# Patient Record
Sex: MALE | Race: WHITE | Employment: OTHER | URBAN - METROPOLITAN AREA
[De-identification: names, ages, dates, MRNs, and addresses within clinical notes are randomized per-mention and may not be internally consistent; named-entity substitution may affect disease eponyms.]

---

## 2018-09-28 RX ORDER — MYCOPHENOLATE MOFETIL 250 MG/1
750 CAPSULE ORAL
COMMUNITY

## 2018-09-28 RX ORDER — SODIUM BICARBONATE 650 MG/1
650 TABLET ORAL 3 TIMES DAILY
COMMUNITY

## 2018-09-28 RX ORDER — TACROLIMUS 1 MG/1
1 CAPSULE ORAL 2 TIMES DAILY
COMMUNITY

## 2018-09-28 RX ORDER — DILTIAZEM HYDROCHLORIDE EXTENDED-RELEASE TABLETS 300 MG/1
300 TABLET, EXTENDED RELEASE ORAL DAILY
COMMUNITY

## 2018-09-28 RX ORDER — ATORVASTATIN CALCIUM 10 MG/1
10 TABLET, FILM COATED ORAL DAILY
COMMUNITY

## 2018-09-29 RX ORDER — KETOROLAC TROMETHAMINE 5 MG/ML
1 SOLUTION OPHTHALMIC SEE ADMIN INSTRUCTIONS
Status: CANCELLED | OUTPATIENT
Start: 2018-09-29

## 2018-09-29 RX ORDER — TROPICAMIDE 10 MG/ML
2 SOLUTION/ DROPS OPHTHALMIC SEE ADMIN INSTRUCTIONS
Status: CANCELLED | OUTPATIENT
Start: 2018-09-29

## 2018-09-29 RX ORDER — HOMATROPINE HYDROBROMIDE OPHTHALMIC 50 MG/ML
2 SOLUTION OPHTHALMIC SEE ADMIN INSTRUCTIONS
Status: CANCELLED | OUTPATIENT
Start: 2018-09-29

## 2018-09-29 RX ORDER — PHENYLEPHRINE HCL 2.5 %
2 DROPS OPHTHALMIC (EYE) SEE ADMIN INSTRUCTIONS
Status: CANCELLED | OUTPATIENT
Start: 2018-09-29

## 2018-10-01 ENCOUNTER — ANESTHESIA (OUTPATIENT)
Dept: SURGERY | Facility: HOSPITAL | Age: 63
End: 2018-10-01
Payer: COMMERCIAL

## 2018-10-01 ENCOUNTER — HOSPITAL ENCOUNTER (OUTPATIENT)
Facility: HOSPITAL | Age: 63
Setting detail: HOSPITAL OUTPATIENT SURGERY
Discharge: HOME | End: 2018-10-01
Attending: OPHTHALMOLOGY | Admitting: OPHTHALMOLOGY
Payer: COMMERCIAL

## 2018-10-01 ENCOUNTER — ANESTHESIA EVENT (OUTPATIENT)
Dept: SURGERY | Facility: HOSPITAL | Age: 63
End: 2018-10-01
Payer: COMMERCIAL

## 2018-10-01 VITALS
BODY MASS INDEX: 18.28 KG/M2 | OXYGEN SATURATION: 98 % | HEIGHT: 75 IN | TEMPERATURE: 98 F | DIASTOLIC BLOOD PRESSURE: 65 MMHG | HEART RATE: 48 BPM | WEIGHT: 147 LBS | SYSTOLIC BLOOD PRESSURE: 107 MMHG | RESPIRATION RATE: 16 BRPM

## 2018-10-01 DIAGNOSIS — H33.001 RETINAL DETACHMENT OF RIGHT EYE WITH RETINAL BREAK: Primary | ICD-10-CM

## 2018-10-01 PROCEDURE — 08QE3ZZ REPAIR RIGHT RETINA, PERCUTANEOUS APPROACH: ICD-10-PCS | Performed by: OPHTHALMOLOGY

## 2018-10-01 PROCEDURE — 08B43ZZ EXCISION OF RIGHT VITREOUS, PERCUTANEOUS APPROACH: ICD-10-PCS | Performed by: OPHTHALMOLOGY

## 2018-10-01 PROCEDURE — 3E0C3GC INTRODUCTION OF OTHER THERAPEUTIC SUBSTANCE INTO EYE, PERCUTANEOUS APPROACH: ICD-10-PCS | Performed by: OPHTHALMOLOGY

## 2018-10-01 RX ORDER — HALOPERIDOL 5 MG/ML
0.25 INJECTION INTRAMUSCULAR ONCE AS NEEDED
Status: DISCONTINUED | OUTPATIENT
Start: 2018-10-01 | End: 2018-10-01

## 2018-10-01 RX ORDER — HYDROCODONE BITARTRATE AND ACETAMINOPHEN 5; 325 MG/1; MG/1
2 TABLET ORAL AS NEEDED
Status: DISCONTINUED | OUTPATIENT
Start: 2018-10-01 | End: 2018-10-01

## 2018-10-01 RX ORDER — SODIUM CHLORIDE, SODIUM LACTATE, POTASSIUM CHLORIDE, CALCIUM CHLORIDE 600; 310; 30; 20 MG/100ML; MG/100ML; MG/100ML; MG/100ML
INJECTION, SOLUTION INTRAVENOUS CONTINUOUS
Status: DISCONTINUED | OUTPATIENT
Start: 2018-10-01 | End: 2018-10-01

## 2018-10-01 RX ORDER — FAMOTIDINE 20 MG/1
20 TABLET ORAL ONCE
Status: DISCONTINUED | OUTPATIENT
Start: 2018-10-01 | End: 2018-10-01 | Stop reason: HOSPADM

## 2018-10-01 RX ORDER — TROPICAMIDE 10 MG/ML
2 SOLUTION/ DROPS OPHTHALMIC SEE ADMIN INSTRUCTIONS
Status: COMPLETED | OUTPATIENT
Start: 2018-10-01 | End: 2018-10-01

## 2018-10-01 RX ORDER — BALANCED SALT SOLUTION 6.4; .75; .48; .3; 3.9; 1.7 MG/ML; MG/ML; MG/ML; MG/ML; MG/ML; MG/ML
SOLUTION OPHTHALMIC AS NEEDED
Status: DISCONTINUED | OUTPATIENT
Start: 2018-10-01 | End: 2018-10-01 | Stop reason: HOSPADM

## 2018-10-01 RX ORDER — METOCLOPRAMIDE 10 MG/1
10 TABLET ORAL ONCE
Status: DISCONTINUED | OUTPATIENT
Start: 2018-10-01 | End: 2018-10-01 | Stop reason: HOSPADM

## 2018-10-01 RX ORDER — ACETAMINOPHEN 500 MG
1000 TABLET ORAL ONCE
Status: COMPLETED | OUTPATIENT
Start: 2018-10-01 | End: 2018-10-01

## 2018-10-01 RX ORDER — DEXAMETHASONE SODIUM PHOSPHATE 4 MG/ML
VIAL (ML) INJECTION AS NEEDED
Status: DISCONTINUED | OUTPATIENT
Start: 2018-10-01 | End: 2018-10-01 | Stop reason: HOSPADM

## 2018-10-01 RX ORDER — NALOXONE HYDROCHLORIDE 0.4 MG/ML
80 INJECTION, SOLUTION INTRAMUSCULAR; INTRAVENOUS; SUBCUTANEOUS AS NEEDED
Status: DISCONTINUED | OUTPATIENT
Start: 2018-10-01 | End: 2018-10-01

## 2018-10-01 RX ORDER — MORPHINE SULFATE 10 MG/ML
6 INJECTION, SOLUTION INTRAMUSCULAR; INTRAVENOUS EVERY 10 MIN PRN
Status: DISCONTINUED | OUTPATIENT
Start: 2018-10-01 | End: 2018-10-01

## 2018-10-01 RX ORDER — MIDAZOLAM HYDROCHLORIDE 1 MG/ML
INJECTION INTRAMUSCULAR; INTRAVENOUS AS NEEDED
Status: DISCONTINUED | OUTPATIENT
Start: 2018-10-01 | End: 2018-10-01 | Stop reason: SURG

## 2018-10-01 RX ORDER — ONDANSETRON 2 MG/ML
4 INJECTION INTRAMUSCULAR; INTRAVENOUS ONCE AS NEEDED
Status: DISCONTINUED | OUTPATIENT
Start: 2018-10-01 | End: 2018-10-01

## 2018-10-01 RX ORDER — OFLOXACIN 3 MG/ML
SOLUTION/ DROPS OPHTHALMIC AS NEEDED
Status: DISCONTINUED | OUTPATIENT
Start: 2018-10-01 | End: 2018-10-01 | Stop reason: HOSPADM

## 2018-10-01 RX ORDER — KETAMINE HYDROCHLORIDE 50 MG/ML
INJECTION, SOLUTION, CONCENTRATE INTRAMUSCULAR; INTRAVENOUS AS NEEDED
Status: DISCONTINUED | OUTPATIENT
Start: 2018-10-01 | End: 2018-10-01 | Stop reason: SURG

## 2018-10-01 RX ORDER — KETOROLAC TROMETHAMINE 5 MG/ML
1 SOLUTION OPHTHALMIC SEE ADMIN INSTRUCTIONS
Status: COMPLETED | OUTPATIENT
Start: 2018-10-01 | End: 2018-10-01

## 2018-10-01 RX ORDER — HYDROCODONE BITARTRATE AND ACETAMINOPHEN 5; 325 MG/1; MG/1
1 TABLET ORAL AS NEEDED
Status: DISCONTINUED | OUTPATIENT
Start: 2018-10-01 | End: 2018-10-01

## 2018-10-01 RX ORDER — PHENYLEPHRINE HCL 2.5 %
2 DROPS OPHTHALMIC (EYE) SEE ADMIN INSTRUCTIONS
Status: COMPLETED | OUTPATIENT
Start: 2018-10-01 | End: 2018-10-01

## 2018-10-01 RX ORDER — MORPHINE SULFATE 4 MG/ML
4 INJECTION, SOLUTION INTRAMUSCULAR; INTRAVENOUS EVERY 10 MIN PRN
Status: DISCONTINUED | OUTPATIENT
Start: 2018-10-01 | End: 2018-10-01

## 2018-10-01 RX ORDER — MORPHINE SULFATE 4 MG/ML
2 INJECTION, SOLUTION INTRAMUSCULAR; INTRAVENOUS EVERY 10 MIN PRN
Status: DISCONTINUED | OUTPATIENT
Start: 2018-10-01 | End: 2018-10-01

## 2018-10-01 RX ORDER — EPHEDRINE SULFATE 50 MG/ML
INJECTION, SOLUTION INTRAVENOUS AS NEEDED
Status: DISCONTINUED | OUTPATIENT
Start: 2018-10-01 | End: 2018-10-01 | Stop reason: SURG

## 2018-10-01 RX ORDER — HOMATROPINE HYDROBROMIDE OPHTHALMIC 50 MG/ML
2 SOLUTION OPHTHALMIC SEE ADMIN INSTRUCTIONS
Status: COMPLETED | OUTPATIENT
Start: 2018-10-01 | End: 2018-10-01

## 2018-10-01 RX ADMIN — KETAMINE HYDROCHLORIDE 20 MG: 50 INJECTION, SOLUTION, CONCENTRATE INTRAMUSCULAR; INTRAVENOUS at 11:21:00

## 2018-10-01 RX ADMIN — MIDAZOLAM HYDROCHLORIDE 2 MG: 1 INJECTION INTRAMUSCULAR; INTRAVENOUS at 11:16:00

## 2018-10-01 RX ADMIN — EPHEDRINE SULFATE 5 MG: 50 INJECTION, SOLUTION INTRAVENOUS at 11:45:00

## 2018-10-01 RX ADMIN — SODIUM CHLORIDE, SODIUM LACTATE, POTASSIUM CHLORIDE, CALCIUM CHLORIDE: 600; 310; 30; 20 INJECTION, SOLUTION INTRAVENOUS at 11:16:00

## 2018-10-01 RX ADMIN — SODIUM CHLORIDE, SODIUM LACTATE, POTASSIUM CHLORIDE, CALCIUM CHLORIDE: 600; 310; 30; 20 INJECTION, SOLUTION INTRAVENOUS at 12:12:00

## 2018-10-01 NOTE — ANESTHESIA POSTPROCEDURE EVALUATION
Patient: Cleophus Setting    Procedure Summary     Date:  10/01/18 Room / Location:  [facility] OR 03 / [facility]    Anesthesia Start:  3808 Anesthesia Stop:      Procedure:  EYE VITRECTOMY WITH INDIRECT/ DIRECT LASER/ IRIDEX (Right Eye) Diagnosis:  (retinal

## 2018-10-01 NOTE — H&P
History & Physical Examination    Name: Milly Shelton  Patient ID:  [id number]  Date:  10/1/2018  YOB: 1955 AGE:  58year old SEX:  male      M.D./D.O./D.P.M PERFORMING SURGERY/PROCEDURE:    Lieutenant Marion MD    Diagnosis: larissa Peña

## 2018-10-01 NOTE — ANESTHESIA PREPROCEDURE EVALUATION
Anesthesia PreOp Note    HPI:     Manjinder López is a 58year old male who presents for preoperative consultation requested by:  Geanie Gottron, MD    Date of Surgery: 10/1/2018    Procedure(s):  EYE VITRECTOMY WITH INDIRECT/ DIRECT LASER/ IRIDEX  Indication: r Years of education: Not on file      Highest education level: Not on file    Social Needs      Financial resource strain: Not on file      Food insecurity - worry: Not on file      Food insecurity - inability: Not on file      Transportation needs - medic informed Tosha Lorenz and/or legal guardian or family member of the nature of the anesthetic plan, benefits, risks including possible dental damage if relevant, major complications, and any alternative forms of anesthetic management.    All of the patient'

## 2018-10-01 NOTE — BRIEF OP NOTE
Pre-Operative Diagnosis: retinal detachment with single break right eye     Post-Operative Diagnosis: same     Procedure Performed:   pars plana vitrectomy, air fluid exchange, endolaser, [id number] gas,  retinal detachment repair right eye    Surgeon(s) and Rol

## (undated) DEVICE — CATH RED RUBBER 16FR S

## (undated) DEVICE — SOL H2O 1000ML BTL

## (undated) DEVICE — Device: Brand: JELCO

## (undated) DEVICE — APPLICATOR COTTON TIP 3 10/PK

## (undated) DEVICE — DISP ATKINSON RETROBULBAR NDL 25G 10/BOX: Brand: DISP ATKINSON RETROBULBAR NDL 25G 10/BOX

## (undated) DEVICE — PROBE LASER ENDO DISP 25 GA

## (undated) DEVICE — PERIFIX® 0.2 µM  FLAT EPIDURAL FILTER - FOR USE WITH ALL PERIFIX CATHETERS AND PERIFIX PINPAD™: Brand: PERIFIX®

## (undated) DEVICE — ISPAN C3F8 PERFLUOROPROPANEISPAN* C3F8 IS A FLUORINATED GREENHOUSE GAS COVERED BY THE KYOTO PROTOCOL AND HAS A GLOBAL WARMING POTENTIAL (GWP) OF 8,600. RESIDUAL ISPAN* C3F8 GAS SHOULD BE RECOVERED BY APPROPRIATELY QUALIFIED PERSONNEL AND RECYCLED, RECLAIMED OR DESTROYED IN ACCORDANCE WITH LOCAL ORDINANCES.: Brand: ISPAN

## (undated) DEVICE — 25GA SOFT TIP CANNULA: Brand: SYNERGETICS

## (undated) DEVICE — STERILE LATEX POWDER-FREE SURGICAL GLOVESWITH NITRILE COATING: Brand: PROTEXIS

## (undated) DEVICE — 60 ML SYRINGE CATHETER TIP: Brand: MONOJECT

## (undated) DEVICE — RETINAL: Brand: MEDLINE INDUSTRIES, INC.

## (undated) DEVICE — PACK SRG BIOM FULL DRP STRL

## (undated) DEVICE — LIMBHOLDER RSTRNT FOAM ADLT

## (undated) DEVICE — 12 ML SYRINGE LUER-LOCK TIP: Brand: MONOJECT

## (undated) DEVICE — SOLUTION IRR BTL 250CC NACL

## (undated) DEVICE — GOWN SURG AERO BLUE PERF LG

## (undated) DEVICE — 25+ TOTAL PLUS VITRECTORY PAK, BEVELED 10,000 CPM ULTRAVIT PROBE STRAIGHT ENDOILLUMINATOR: Brand: CONSTELLATION, EDGE PLUS, TOTAL PLUS, ULTRAVIT

## (undated) DEVICE — SOLUTION IRR BSS+